# Patient Record
(demographics unavailable — no encounter records)

---

## 2024-10-11 NOTE — PLAN
[Cognitive and/or Behavior Therapy] : Cognitive and/or Behavior Therapy  [Supportive Therapy] : Supportive Therapy [FreeTextEntry2] : Goal(s): Carlitos will gain understanding of his gender identity to manage his reported symptoms of gender dysphoria; Carlitos will gain useful CBT skills to manage negative, anxious thought processes Objective(s): Carlitos will participate in psychoeducation during session related to gender identity and sexual orientation so that he can better understand himself and express that; Carlitos will practice CBT skills and report success or failure with these skills, during session; Carlitos will take steps to affirm his gender in non-permanent ways and will express how this impacts his symptoms during visits   Individual talk therapy w/LCSW 1-4x per month; Family therapy as needed, duration 6-8 months  [de-identified] : LCSW and Carlitos met for individual talk therapy in person. Carlitos presented dressed in cos-play as one of their favorite characters from a movie, they appear anxious and sad, which they report, coming tearful at times during this visit.  Carlitos does confirm that they started prescribed psychotropic medication but report some side effects/little relief at this time, but we note that they have only taken this dose for abut 2 weeks so far.  LCSW provides a safe space for Carlitos to express and explore their feelings, noting that triggers have come from some bullying from peers and issues within some of their friendships.  This has resulted in Carlitos experiencing stomach aches and anxious feelings that has interfered with them attending all of their classes. We review some specific behavioral interventions they can use to tolerate some of this anxiety, as well as supportive cognition to challenge or ignore negative thought processes, especially related to beliefs of having to "fit in" with their peers.  Carlitos does report seeking support from teachers they trust and school counselors but expresses feeling that the school can minimize their experience being bullied, and thus Carlitos's dad will communicate this with the school, ongoing until it is resolved.  We confirm our next visit for 2 weeks but Carlitos and their dad are encouraged to reach the office if Carlitos's symptoms worsen.  They will track progress with medications and are provided with resources for Carlitos to be treated by a psychiatrist who works with adolescents.    Time in: 4:03pm     Time out: 4:55pm Total time spent face to face: 52 min [FreeTextEntry1] : [facility] provides the psychiatric referrals below to Carlitos's father Devan following our visit -  [facility], outpatient Behavioral Health Parent can call to make referral: [phone]  [facility] - Behavioral Health Crisis Walk-in Can call [phone] to "hold a spot" or schedule a virtual assessment   [facility] - psychiatric services [place] office: [phone] [place] online: [email or web address]

## 2024-10-11 NOTE — REASON FOR VISIT
[Patient] : Patient [FreeTextEntry1] : routine outpatient talk therapy; the patient identifies as genderfluid, uses any pronouns and prefers to be called Carlitos

## 2024-10-11 NOTE — PLAN
[Cognitive and/or Behavior Therapy] : Cognitive and/or Behavior Therapy  [Supportive Therapy] : Supportive Therapy [FreeTextEntry2] : Goal(s): Carlitos will gain understanding of his gender identity to manage his reported symptoms of gender dysphoria; Carlitos will gain useful CBT skills to manage negative, anxious thought processes Objective(s): Carlitos will participate in psychoeducation during session related to gender identity and sexual orientation so that he can better understand himself and express that; Carlitos will practice CBT skills and report success or failure with these skills, during session; Carlitos will take steps to affirm his gender in non-permanent ways and will express how this impacts his symptoms during visits   Individual talk therapy w/LCSW 1-4x per month; Family therapy as needed, duration 6-8 months  [de-identified] : LCSW and Carlitos met for individual talk therapy in person. Carlitos presented dressed in cos-play as one of their favorite characters from a movie, they appear anxious and sad, which they report, coming tearful at times during this visit.  Carlitos does confirm that they started prescribed psychotropic medication but report some side effects/little relief at this time, but we note that they have only taken this dose for abut 2 weeks so far.  LCSW provides a safe space for Carlitos to express and explore their feelings, noting that triggers have come from some bullying from peers and issues within some of their friendships.  This has resulted in Carlitos experiencing stomach aches and anxious feelings that has interfered with them attending all of their classes. We review some specific behavioral interventions they can use to tolerate some of this anxiety, as well as supportive cognition to challenge or ignore negative thought processes, especially related to beliefs of having to "fit in" with their peers.  Carlitos does report seeking support from teachers they trust and school counselors but expresses feeling that the school can minimize their experience being bullied, and thus Carlitos's dad will communicate this with the school, ongoing until it is resolved.  We confirm our next visit for 2 weeks but Carlitos and their dad are encouraged to reach the office if Carlitos's symptoms worsen.  They will track progress with medications and are provided with resources for Carlitos to be treated by a psychiatrist who works with adolescents.    Time in: 4:03pm     Time out: 4:55pm Total time spent face to face: 52 min [FreeTextEntry1] : [facility] provides the psychiatric referrals below to Carlitos's father Devan following our visit -  [facility], outpatient Behavioral Health Parent can call to make referral: [phone]  [facility] - Behavioral Health Crisis Walk-in Can call [phone] to "hold a spot" or schedule a virtual assessment   [facility] - psychiatric services [place] office: [phone] [place] online: [email or web address]

## 2024-10-29 NOTE — REASON FOR VISIT
[Consultation for neuropsychological evaluation] : Consultation for neuropsychological evaluation [Patient with collateral] : Patient with collateral  [Father] : father [Supervisor present for visit (for trainees)] : Supervisor present for visit (for trainees). [FreeTextEntry1] : Provider discussed confidentiality of information shared in this visit and limits to confidentiality. Patient expressed understanding and agreement.

## 2024-10-29 NOTE — HISTORY OF PRESENT ILLNESS
[FreeTextEntry1] : CURRENT CONCERNS Carlitos and their father reported concerns about the following: -	Attention and behavior regulation: Carlitos shared they have had friends with ADHD who have expressed Carlitos seems to have symptoms of ADHD. Per screening by Carlitos's therapist, Carlitos endorsed difficulties sitting still, daydreaming, feeling distracted easily, sometimes feeling driven by a motor. This prompted Carlitos to seek evaluation for ADHD. Carlitos reported having difficulty paying attention when doing work and needing to listen to music to focus. They make some careless errors in work, more often when assignments are done digitally and they miss a page. They sometimes forget to complete assignments. They get easily distracted by sounds in the hallway in class. Carlitos's father does not observe these difficulties at home. Carlitos has some difficulty following multistep instructions, which their father also notices, but attributes to Carlitos not paying attention as they are distracted by their phone when given instructions about chores. Carlitos reports being forgetful at times (e.g., forgetting to complete a chore), which their father also endorses.  Carlitos reports feeling fidgety (often bouncing leg up and down, tapping pen on desk) in class, but not getting up out of their seat out of turn. They are able to stay still for long periods of time at home (e.g., staying in bed, watching TV). They and their father report Carlitos can be very chatty and trail off in conversations. Carlitos reported blurting out in class when younger, although their father does not recall this as a concern from teachers. They sometimes interrupt conversations. Does not endorse excessive running/climbing, always being on the go, difficulty playing or working quietly, difficulty waiting their turn.  Carlitos reports starting to experience difficulties with attention around 6th grade. Carlitos's father also reports Carlitos did not present with difficulties with attention or behavior throughout elementary school, and teachers always gave feedback that Carlitos was a very well-behaved student. Does not endorse significant difficulties with listening, staying organized, getting started on tasks. -	Anxiety: Carlitos reported they have always been an anxious child. Their father noted anxiety was not overt when Carlitos was younger, but became apparent when they entered 7th grade when they entered a new school. Carlitos started worrying about grades and school performance, what their parents would think of them. They worry about their future (not getting into a good college). They worry about school shootings, and this has led to some school avoidance over the past two years. Anxiety and avoidance behaviors have persisted; per records over the past two months, Carlitos has been calling their dad from school crying and wanting to picked up due to anxiety and stomachaches. Carlitos also experiences anxiety around social situations. Triggers for anxiety come from friendship issues and continuing bullying from a peer. Carlitos seeks support from trusted teachers and counselors at school but expressed feeling that the school can minimize their experience being bullied, and this is being addressed among school and the family. Carlitos experiences social anxiety and fear of judgement surrounding social interactions. Their father also reports Carlitos tends to blame themselves when things do not work out and can perseverate on certain things. Otherwise, they can also be flexible about adapting to changes in plans. Carlitos has also been worrying about their grandfather who has cancer. Carlitos reports worrying half of the day, and sometimes worries interfere with sleep onset.   -	Social functioning: Friendships have come and gone; Carlitos reported falling out with friends due to them being "toxic." Right now they have good friendships. Carlitos and their father report Carlitos having some difficulties making new friends in the past two years. Their father does not recall Carlitos having difficulty making friends when younger; Carlitos recalls some difficulties. They report being able to read others' social intentions if they know them well.    RELEVANT HISTORY  Birth/Developmental:  -	Mother had a stroke (unknown etiology) before going into labor. Carlitos was delivered via  section full term and was possibly breech. No NICU stay.  -	Had speech and fine motor delays, and received speech therapy and occupational therapy upon entering school.   Medical:  -	No significant medical history.  -	has some difficulties falling asleep and experiences some daytime sleepiness.  -	wears glasses for nearsightedness Emotional / Behavioral / Social / Adaptive:  -	History of being bullied at school related to gender identity  -	Has been in therapy twice a month with Krissy Leyva LCSW at the [facility] since 2024. Previously met with school counselor.  -	Experiences gender/body dysmorphia and has been engaging in affirming therapy with Krissy Leyva to explore gender identify and address anxiety. Identifies as genderqueer at this time. Is learning about options regarding gender affirming medical care but not engaging in medical transition at this time.  -	Per records from 2024, Carlitos had history of passive suicidal ideation without intent or plan prior to coming out about their gender identity.  -	Regarding social functioning, per records, Carlitos has expressed that they struggled at times to understand nuances of relationship dynamics, mostly with peers.   Medications: Fluoxetine 10mg, started 3 weeks ago. Iron supplements.  Educational: In the 9th grade at [employer]. Has an IEP. Parent will send most recent IEP. Has history of difficulties learning to read and had pull-out supports for reading and speech therapy during elementary school. Currently received modified reading assignments as well as texts read to them. Overall maintaining grades in the 80s to 90s. Reports science and math are harder. [place] class last year was very difficult. Art and English feel easier.    Family: Lives at home with mother, father, grandfather, step-grandmother, father's half brother, and two dogs. English is spoken at home. Father's half brother is on the autism spectrum.   Prior Cognitive Testing:  -	Maybe be due for psychoeducational testing at school; parent will ask school for records.

## 2024-10-29 NOTE — DISCUSSION/SUMMARY
[FreeTextEntry8] : Carlitos Jackson is a 14 year, AFAB genderqueer individual with history of anxiety disorder and gender dysphoria. Carlitos was referred for neuropsychological evaluation by their therapist, Krissy Leyva ([facility]) to characterize their neuropsychological functioning, gain diagnostic clarity around attention regulation difficulties, and inform intervention and education recommendations.  Carlitos is in the 9th grade with and IEP. Previously had reading supports and speech therapy. Received speech and OT when younger. Longstanding history of anxiety that exacerbated in 7th grade and continues. Over the past two years, in the context of experiencing bullying in school related to gender identity, as well as anxieties about a range of aspects of Carlitos's life, anxiety has led to school avoidance. Carlitos has also experienced some difficulties with attention and fidgeting and wonders if they have ADHD. Currently in therapy biweekly and recently started fluoxetine and follows with psychiatrist.  [FreeTextEntry4] : Comprehensive neuropsychological evaluation is recommended in order to characterize the nature and extent of the cognitive, behavioral, and emotional concerns.

## 2024-10-29 NOTE — PHYSICAL EXAM
[Intermittent] : intermittent [Cooperative] : cooperative [Euthymic] : euthymic [Full] : full [Clear] : clear [Average] : average [WNL] : within normal limits [Positive interaction] : positive interaction [Not applicable] : not applicable [FreeTextEntry6] : somewhat difficult to follow and may not answer question being asked directly

## 2024-10-30 NOTE — REASON FOR VISIT
[Patient preference] : as per patient preference [Continuing, patient seen in-person within last 12 months] : Telehealth services are continuing as patient has been seen in-person within last 12 months. [Telehealth (audio & video) - Individual/Group] : This visit was provided via telehealth using real-time 2-way audio visual technology. [Medical Office: ([facility])___] : The provider was located at the medical office in [unfilled]. [Home] : The patient, [unfilled], was located at home, [unfilled], at the time of the visit. [Patient's space is appropriate for telehealth and maintains privacy/confidentiality.] : Patient's space is appropriate for telehealth and maintains privacy/confidentiality. [Participant(s) identity verified] : Participant(s) identity verified. [Verbal consent obtained from patient/other participant(s)] : Verbal consent for telehealth/telephonic services obtained from patient/other participant(s) [FreeTextEntry4] : 4:03pm  [FreeTextEntry5] : 4:48pm [FreeTextEntry2] : 10/9/2024 [Patient] : Patient [FreeTextEntry1] : routine outpatient talk therapy; the patient identifies as gender fluid/gender queer, uses any pronouns but prefers to be called Carlitos

## 2024-10-30 NOTE — PLAN
[FreeTextEntry2] : Goal(s): Carlitos will gain understanding of his gender identity to manage his reported symptoms of gender dysphoria; Carlitos will gain useful CBT skills to manage negative, anxious thought processes Objective(s): Carlitos will participate in psychoeducation during session related to gender identity and sexual orientation so that he can better understand himself and express that; Carlitos will practice CBT skills and report success or failure with these skills, during session; Carlitos will take steps to affirm his gender in non-permanent ways and will express how this impacts his symptoms during visits   Individual talk therapy w/LCSW 1-4x per month; Family therapy as needed, duration 6-8 months  [Cognitive and/or Behavior Therapy] : Cognitive and/or Behavior Therapy  [Supportive Therapy] : Supportive Therapy [de-identified] : LCSW and Carlitos met for individual talk therapy via telehealth video visit. Carlitos was at home during this visit, they consented to this visit and confirmed that there was a private space to speak.  Carlitos presents with improved mood and anxiety this visit, is not tearful and reports improvement in some interpersonal relationships that have resulted in reduction of stressors overall.  Carlitos also reports noticing some benefit from current psychotropic medication dose, reports better understanding when to take it and that taking it with food has resulted in reduction of side effect of upset stomach.  Carlitos processes and explores some of the improved dynamics with friends and how they are working to make and connect with new friends to build their social supports, which we celebrate.  Carlitos also processes some efforts to further explore their gender expression, noting that they have been experimenting with their femininity through some cosplay with friends.  We review Carlitos's upcoming visit for psych testing and they confirm that their dad has reminded them about this visit. Our next visit is set for 2 weeks.    Time in:  4:03pm    Time out:  4:48pm Total time spent face to face, via video: 45 min   [Return in ____ week(s)] : Return in [unfilled] week(s)

## 2024-10-30 NOTE — REASON FOR VISIT
[Patient preference] : as per patient preference [Continuing, patient seen in-person within last 12 months] : Telehealth services are continuing as patient has been seen in-person within last 12 months. [Telehealth (audio & video) - Individual/Group] : This visit was provided via telehealth using real-time 2-way audio visual technology. [Medical Office: ([facility])___] : The provider was located at the medical office in [unfilled]. [Home] : The patient, [unfilled], was located at home, [unfilled], at the time of the visit. [Patient's space is appropriate for telehealth and maintains privacy/confidentiality.] : Patient's space is appropriate for telehealth and maintains privacy/confidentiality. [Participant(s) identity verified] : Participant(s) identity verified. [Verbal consent obtained from patient/other participant(s)] : Verbal consent for telehealth/telephonic services obtained from patient/other participant(s) [FreeTextEntry4] : 4:03pm  [FreeTextEntry5] : 4:48pm [FreeTextEntry2] : 10/9/2024 [Patient] : Patient [FreeTextEntry1] : routine outpatient talk therapy; the patient identifies as gender fluid/gender queer, uses any pronouns but prefers to be called Carlitos

## 2024-10-30 NOTE — PLAN
[FreeTextEntry2] : Goal(s): Carlitos will gain understanding of his gender identity to manage his reported symptoms of gender dysphoria; Carlitos will gain useful CBT skills to manage negative, anxious thought processes Objective(s): Carlitos will participate in psychoeducation during session related to gender identity and sexual orientation so that he can better understand himself and express that; Carlitos will practice CBT skills and report success or failure with these skills, during session; Carlitos will take steps to affirm his gender in non-permanent ways and will express how this impacts his symptoms during visits   Individual talk therapy w/LCSW 1-4x per month; Family therapy as needed, duration 6-8 months  [Cognitive and/or Behavior Therapy] : Cognitive and/or Behavior Therapy  [Supportive Therapy] : Supportive Therapy [de-identified] : LCSW and Carlitos met for individual talk therapy via telehealth video visit. Carlitos was at home during this visit, they consented to this visit and confirmed that there was a private space to speak.  Carlitos presents with improved mood and anxiety this visit, is not tearful and reports improvement in some interpersonal relationships that have resulted in reduction of stressors overall.  Carlitos also reports noticing some benefit from current psychotropic medication dose, reports better understanding when to take it and that taking it with food has resulted in reduction of side effect of upset stomach.  Carlitos processes and explores some of the improved dynamics with friends and how they are working to make and connect with new friends to build their social supports, which we celebrate.  Carlitos also processes some efforts to further explore their gender expression, noting that they have been experimenting with their femininity through some cosplay with friends.  We review Carlitos's upcoming visit for psych testing and they confirm that their dad has reminded them about this visit. Our next visit is set for 2 weeks.    Time in:  4:03pm    Time out:  4:48pm Total time spent face to face, via video: 45 min   [Return in ____ week(s)] : Return in [unfilled] week(s)

## 2024-11-04 NOTE — PHYSICAL EXAM
[Intermittent] : intermittent [Cooperative] : cooperative [Euthymic] : euthymic [Full] : full [Linear/Goal Directed] : linear/goal directed [Average] : average [WNL] : within normal limits [Positive interaction] : positive interaction [Unremarkable/age appropriate] : unremarkable/age appropriate [de-identified] : some speech organization issues and grammar errors
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
## 2024-11-04 NOTE — PHYSICAL EXAM
[Intermittent] : intermittent [Cooperative] : cooperative [Euthymic] : euthymic [Full] : full [Linear/Goal Directed] : linear/goal directed [Average] : average [WNL] : within normal limits [Positive interaction] : positive interaction [Unremarkable/age appropriate] : unremarkable/age appropriate [de-identified] : some speech organization issues and grammar errors
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
## 2024-11-11 NOTE — PLAN
[Cognitive and/or Behavior Therapy] : Cognitive and/or Behavior Therapy  [Supportive Therapy] : Supportive Therapy [FreeTextEntry2] : Goal(s): Carlitos will gain understanding of his gender identity to manage his reported symptoms of gender dysphoria; Carlitos will gain useful CBT skills to manage negative, anxious thought processes Objective(s): Carlitos will participate in psychoeducation during session related to gender identity and sexual orientation so that he can better understand himself and express that; Carlitos will practice CBT skills and report success or failure with these skills, during session; Carlitos will take steps to affirm his gender in non-permanent ways and will express how this impacts his symptoms during visits   Individual talk therapy w/LCSW 1-4x per month; Family therapy as needed, duration 6-8 months  [de-identified] : LCSW and Carlitos met for individual talk therapy in person. Carlitos presented as casually dressed and androgynously dressed and was engaged in our visit.  Carlitos presents as emotionally stable with some improvement in emotional expression since last visit.  Carlitos uses the session to process and explore their feelings associated with the end of a friendship with their identified "best friend."  They provide some context to this and their efforts to receive support from their parents and some other friends as they processed this change.  Carlitos can identify the unhealthy aspects of this friendship and thus can support themselves in their decision making related to ending this friendship.  They note the value of having friends that identify as part of the LGBTQ+ community but note that this was the only thing they felt that they had in common with this friend recently, and can identify the value of seeking friendships with others at this time.  Carlitos reports efforts also to further experiment with their gender expression and feels comfortable right now as themself. We review some self-care activities that Carlitos has engaged in to help manage their anxiety.  Carlitos and their father, whom we check-in with at the end of the session, confirm that Carlitos's symptoms of anxiety appear to be better managed with current psychotropic medication dose.  They also confirm completion of psychological testing with [facility] (Dr. Teena Osman) and are awaiting results regarding additional diagnoses.  We confirm our next visit for 2 weeks.    Time in:  4:03pm      Time out: 4:55pm Total time spent face to face: 52 min   [Return in ____ week(s)] : Return in [unfilled] week(s)

## 2024-11-11 NOTE — REASON FOR VISIT
[Patient] : Patient [FreeTextEntry1] : routine outpatient talk therapy; the patient prefers to be called Carlitos and is comfortable with any pronouns

## 2024-12-02 NOTE — DISCUSSION/SUMMARY
[FreeTextEntry8] : Evaluation results discussed with patient and family. Provider answered patient's questions. Family expressed understanding of results and recommendations. [FreeTextEntry4] : Report to follow.

## 2024-12-02 NOTE — HISTORY OF PRESENT ILLNESS
[FreeTextEntry1] : CURRENT CONCERNS Carlitos and their father reported concerns about the following: -	Attention and behavior regulation: Carlitos shared they have had friends with ADHD who have expressed Carlitos seems to have symptoms of ADHD. Per screening by Carlitos's therapist, Carlitos endorsed difficulties sitting still, daydreaming, feeling distracted easily, sometimes feeling driven by a motor. This prompted Carlitos to seek evaluation for ADHD. Carlitos reported having difficulty paying attention when doing work and needing to listen to music to focus. They make some careless errors in work, more often when assignments are done digitally and they miss a page. They sometimes forget to complete assignments. They get easily distracted by sounds in the hallway in class. Carlitos's father does not observe these difficulties at home. Carlitos has some difficulty following multistep instructions, which their father also notices, but attributes to Carlitos not paying attention as they are distracted by their phone when given instructions about chores. Carlitos reports being forgetful at times (e.g., forgetting to complete a chore), which their father also endorses.  Carlitos reports feeling fidgety (often bouncing leg up and down, tapping pen on desk) in class, but not getting up out of their seat out of turn. They are able to stay still for long periods of time at home (e.g., staying in bed, watching TV). They and their father report Carlitos can be very chatty and trail off in conversations. Carlitos reported blurting out in class when younger, although their father does not recall this as a concern from teachers. They sometimes interrupt conversations. Does not endorse excessive running/climbing, always being on the go, difficulty playing or working quietly, difficulty waiting their turn.  Carlitos reports starting to experience difficulties with attention around 6th grade. Carlitos's father also reports Carlitos did not present with difficulties with attention or behavior throughout elementary school, and teachers always gave feedback that Carlitos was a very well-behaved student. Does not endorse significant difficulties with listening, staying organized, getting started on tasks. -	Anxiety: Carlitos reported they have always been an anxious child. Their father noted anxiety was not overt when Carlitos was younger, but became apparent when they entered 7th grade when they entered a new school. Carlitos started worrying about grades and school performance, what their parents would think of them. They worry about their future (not getting into a good college). They worry about school shootings, and this has led to some school avoidance over the past two years. Anxiety and avoidance behaviors have persisted; per records over the past two months, Carlitos has been calling their dad from school crying and wanting to picked up due to anxiety and stomachaches. Carlitos also experiences anxiety around social situations. Triggers for anxiety come from friendship issues and continuing bullying from a peer. Carlitos seeks support from trusted teachers and counselors at school but expressed feeling that the school can minimize their experience being bullied, and this is being addressed among school and the family. Carlitos experiences social anxiety and fear of judgement surrounding social interactions. Their father also reports Carlitos tends to blame themselves when things do not work out and can perseverate on certain things. Otherwise, they can also be flexible about adapting to changes in plans. Carlitos has also been worrying about their grandfather who has cancer. Carlitos reports worrying half of the day, and sometimes worries interfere with sleep onset.   -	Social functioning: Friendships have come and gone; Carlitos reported falling out with friends due to them being "toxic." Right now they have good friendships. Carlitos and their father report Carlitos having some difficulties making new friends in the past two years. Their father does not recall Carlitos having difficulty making friends when younger; Carlitos recalls some difficulties. They report being able to read others' social intentions if they know them well.    RELEVANT HISTORY  Birth/Developmental:  -	Mother had a stroke (unknown etiology) before going into labor. Carlitos was delivered via  section full term and was possibly breech. No NICU stay.  -	Had speech and fine motor delays, and received speech therapy and occupational therapy upon entering school.   Medical:  -	No significant medical history.  -	has some difficulties falling asleep and experiences some daytime sleepiness.  -	wears glasses for nearsightedness Emotional / Behavioral / Social / Adaptive:  -	History of being bullied at school related to gender identity  -	Has been in therapy twice a month with Krissy Leyva LCSW at the [facility] since 2024. Previously met with school counselor.  -	Experiences gender/body dysmorphia and has been engaging in affirming therapy with Krissy Leyva to explore gender identify and address anxiety. Identifies as genderqueer at this time. Is learning about options regarding gender affirming medical care but not engaging in medical transition at this time.  -	Per records from 2024, Carlitos had history of passive suicidal ideation without intent or plan prior to coming out about their gender identity.  -	Regarding social functioning, per records, Carliots has expressed that they struggled at times to understand nuances of relationship dynamics, mostly with peers.   Medications: Fluoxetine 10mg, started 3 weeks ago. Iron supplements.  Educational: In the 9th grade at [employer]. Has an IEP. Parent will send most recent IEP. Has history of difficulties learning to read and had pull-out supports for reading and speech therapy during elementary school. Currently received modified reading assignments as well as texts read to them. Overall maintaining grades in the 80s to 90s. Reports science and math are harder. [place] class last year was very difficult. Art and English feel easier.    Family: Lives at home with mother, father, grandfather, step-grandmother, father's half brother, and two dogs. English is spoken at home. Father's half brother is on the autism spectrum.   Prior Cognitive Testing:  -	Maybe be due for psychoeducational testing at school; parent will ask school for records.

## 2024-12-02 NOTE — REASON FOR VISIT
[Patient preference] : as per patient preference [Starting, patient seen in-person within last 6 months] : Telehealth services are being started as patient has seen in-person within last 6 months. [Telehealth (audio & video) - Individual/Group] : This visit was provided via telehealth using real-time 2-way audio visual technology. [Medical Office: ([facility])___] : The provider was located at the medical office in [unfilled]. [Home] : The patient, [unfilled], was located at home, [unfilled], at the time of the visit. [Patient's space is appropriate for telehealth and maintains privacy/confidentiality.] : Patient's space is appropriate for telehealth and maintains privacy/confidentiality. [Participant(s) identity verified] : Participant(s) identity verified. [Verbal consent obtained from patient/other participant(s)] : Verbal consent for telehealth/telephonic services obtained from patient/other participant(s) [FreeTextEntry4] : 2:30pm [FreeTextEntry5] : 3:30pm [Feedback of results of neuropsychological evaluation] : Feedback of results of neuropsychological evaluation [Patient with collateral] : Patient with collateral  [Father] : father [FreeTextEntry1] : Carlitos Jackson is a 14 year-old AFAB genderqueer individual with history of anxiety disorder and gender dysphoria. Carlitos was referred for neuropsychological evaluation by their therapist, Krissy Leyva ([facility]) to characterize their neuropsychological functioning, gain diagnostic clarity around attention regulation difficulties, and inform intervention and education recommendations.

## 2024-12-09 NOTE — REASON FOR VISIT
[Patient] : Patient [FreeTextEntry1] : Routine outpatient talk therapy; the patient identifies as gender fluid, uses any pronouns and prefers to be called Carlitos

## 2024-12-09 NOTE — PLAN
[FreeTextEntry2] : Goal(s): Carlitos will gain understanding of his gender identity to manage his reported symptoms of gender dysphoria; Carlitos will gain useful CBT skills to manage negative, anxious thought processes Objective(s): Carlitos will participate in psychoeducation during session related to gender identity and sexual orientation so that he can better understand himself and express that; Carlitos will practice CBT skills and report success or failure with these skills, during session; Carlitos will take steps to affirm his gender in non-permanent ways and will express how this impacts his symptoms during visits   Individual talk therapy w/LCSW 1-4x per month; Family therapy as needed, duration 6-8 months  [Cognitive and/or Behavior Therapy] : Cognitive and/or Behavior Therapy  [Supportive Therapy] : Supportive Therapy [de-identified] : LCSW and Carlitos met for individual talk therapy in person. Carlitos presented as well-groomed, casually dressed and engaged in our visit.  While getting Carlitos from the waiting room, Carlitos's dad provides some updates regarding outcome of psych testing that confirms Carlitos's anxiety, though they were not diagnosed with ADHD, it further explains some of their learning disabilities and auditory processing challenges.  Mr. Jackson shares that he will be providing the updated report to Carlitos's school to update their IEP and that the psychologists confirm that Carlitos can remain on current dose of medication; Dr. Gordillo is informed.  In session Carlitos reports that overall they are doing well, sharing a major reduction in anxiety with current psychotropic medication noting that their anxiety is more manageable for them and has thus interfered less with their ability to attend school regularly and engage socially.  Carlitos provides some updates about their efforts to better manage friendships, noting making some new friends and better tolerating some bullying from other peers.  Carlitos continues to feel more comfortable being called by their preferred names but notes exploring their gender identity, more femininely through clothing styles and support from a new friend who has lent Carlitos some of their clothes.  We celebrate this improvement and confirm our next visit for 2 weeks.   Time in: 3:50pm     Time out: 4:36pm Total time spent face to face: 46 min [Return in ____ week(s)] : Return in [unfilled] week(s)

## 2025-01-09 NOTE — PLAN
[Cognitive and/or Behavior Therapy] : Cognitive and/or Behavior Therapy  [Supportive Therapy] : Supportive Therapy [FreeTextEntry2] : Goal(s): Carlitos will gain understanding of his gender identity to manage his reported symptoms of gender dysphoria; Carlitos will gain useful CBT skills to manage negative, anxious thought processes Objective(s): Carlitos will participate in psychoeducation during session related to gender identity and sexual orientation so that he can better understand himself and express that; Carlitos will practice CBT skills and report success or failure with these skills, during session; Carlitos will take steps to affirm his gender in non-permanent ways and will express how this impacts his symptoms during visits   Individual talk therapy w/LCSW 1-4x per month; Family therapy as needed, duration 6-8 months  [de-identified] : LCSW and Carlitos met for individual talk therapy via telehealth video visit. Carlitos was at home during this visit, Carlitos consented to this visit and confirmed that there was a private space to speak.  Carlitos shares that they are doing ok, noting that "really nothing" is new.  They report enjoying celebrating the holidays with their family and used some gift money to purchase new clothing for themselves.  Carlitos admits that it has been "confusing" as they continue to explore their gender expression and thus gender identity after noting that they purchased more feminine clothing and received many compliments at school for their new clothes.  We explore their ability to continue to "experiment" with what they prefer and what makes them feel comfortable.  They report experiencing less dysphoria overall as they explore their fluidity, likely as they note feeling more comfortable with femininity than when we originally started meeting.  We agree to continue to monitor this and set our next visit for 2 weeks.   Time in: 4:09pm     Time out:  4:35pm Total time spent face to face, via video: 26 min [Return in ____ week(s)] : Return in [unfilled] week(s)

## 2025-01-09 NOTE — REASON FOR VISIT
[Patient preference] : as per patient preference [Continuing, patient seen in-person within last 12 months] : Telehealth services are continuing as patient has been seen in-person within last 12 months. [Telehealth (audio & video) - Individual/Group] : This visit was provided via telehealth using real-time 2-way audio visual technology. [Medical Office: ([facility])___] : The provider was located at the medical office in [unfilled]. [Home] : The patient, [unfilled], was located at home, [unfilled], at the time of the visit. [Patient's space is appropriate for telehealth and maintains privacy/confidentiality.] : Patient's space is appropriate for telehealth and maintains privacy/confidentiality. [Participant(s) identity verified] : Participant(s) identity verified. [Verbal consent obtained from patient/other participant(s)] : Verbal consent for telehealth/telephonic services obtained from patient/other participant(s) [Patient] : Patient [FreeTextEntry4] : 4:09pm [FreeTextEntry5] : 4:35pm [FreeTextEntry1] : Routine outpatient talk therapy; the patient identifies as gender fluid, uses any pronouns and prefers to be called Carlitos

## 2025-01-28 NOTE — REASON FOR VISIT
[Patient preference] : as per patient preference [Continuing, patient seen in-person within last 12 months] : Telehealth services are continuing as patient has been seen in-person within last 12 months. [Telehealth (audio & video) - Individual/Group] : This visit was provided via telehealth using real-time 2-way audio visual technology. [Medical Office: ([facility])___] : The provider was located at the medical office in [unfilled]. [Home] : The patient, [unfilled], was located at home, [unfilled], at the time of the visit. [Patient's space is appropriate for telehealth and maintains privacy/confidentiality.] : Patient's space is appropriate for telehealth and maintains privacy/confidentiality. [Participant(s) identity verified] : Participant(s) identity verified. [Verbal consent obtained from patient/other participant(s)] : Verbal consent for telehealth/telephonic services obtained from patient/other participant(s) [FreeTextEntry4] : 4:04pm [FreeTextEntry5] : 4:49pm [Patient] : Patient [FreeTextEntry1] : Routine outpatient talk therapy; the patient identifies as gender fluid, uses any pronouns and prefers to be called Carlitos

## 2025-01-28 NOTE — PLAN
[FreeTextEntry2] : Goal(s): Carlitos will gain understanding of his gender identity to manage his reported symptoms of gender dysphoria; Carlitos will gain useful CBT skills to manage negative, anxious thought processes Objective(s): Carlitos will participate in psychoeducation during session related to gender identity and sexual orientation so that he can better understand himself and express that; Carlitos will practice CBT skills and report success or failure with these skills, during session; Carlitos will take steps to affirm his gender in non-permanent ways and will express how this impacts his symptoms during visits   Individual talk therapy w/LCSW 1-4x per month; Family therapy as needed, duration 6-8 months  [Cognitive and/or Behavior Therapy] : Cognitive and/or Behavior Therapy  [Supportive Therapy] : Supportive Therapy [de-identified] : LCSW and Carlitos met for individual talk therapy via telehealth video visit. Carlitos was at home during this visit, he consented to this visit and confirmed that there was a private space to speak.  Carlitos shares that they are doing ok, noting that they have had a long weekend and are taking mid-term exams this week.  Carlitos continues to report improvement and better management of their anxiety overall.  We explore some boundary setting as they note feeling frustrated with a friend who they believe is taking advantage of some of their niceness.  Carlitos is able to identify feeling frustrated but notes the value of standing up for themself and can note how other friends have made them feel appreciated and supported compared with this other friend.  Regarding their gender identity, Carlitos continues to appreciate being themselves and exploring their gender expression in ways that feel comfortable for them.  They remain firm on using their preferred name but better appreciate their feminine expression.  We will continue to monitor Carlitos's anxiety and triggers but note some of their progress.  Support is offered.   Time in:  4:04pm     Time out: 4:49pm Total time spent face to face, via video: 45 min  [Return in ____ week(s)] : Return in [unfilled] week(s)

## 2025-02-20 NOTE — REASON FOR VISIT
[Patient] : Patient [FreeTextEntry1] : routine outpatient talk therapy; the patient is comfortable with any pronouns prefers to be called Carlitos

## 2025-02-20 NOTE — PLAN
[FreeTextEntry2] : Goal(s): Carlitos will gain understanding of his gender identity to manage his reported symptoms of gender dysphoria; Carlitos will gain useful CBT skills to manage negative, anxious thought processes Objective(s): Carlitos will participate in psychoeducation during session related to gender identity and sexual orientation so that he can better understand himself and express that; Carlitos will practice CBT skills and report success or failure with these skills, during session; Carlitos will take steps to affirm his gender in non-permanent ways and will express how this impacts his symptoms during visits   Individual talk therapy w/LCSW 1-4x per month; Family therapy as needed, duration 6-8 months  [Cognitive and/or Behavior Therapy] : Cognitive and/or Behavior Therapy  [Supportive Therapy] : Supportive Therapy [de-identified] : LCSW and Carlitos met for individual talk therapy in person. Carlitos presented as well-groomed, casually dressed and engaged in our visit.  Overall, Carlitos reports doing ok, noting that they are appreciating being on break for school holidays.  Carlitos processes some increase in anxiety that interfered with their ability to go to school the week prior, noting that the trigger was a specific interaction with a peer whom Carlitos felt pressure from and thus their anxiety manifested through reported physical symptoms like upset stomach and vomiting.  Carlitos's parents continue to be advocates for Carlitos and the issue was addressed but not "dealt with" according to Carlitos but they note feeling less anxious about it and express their plan to "ignore" this person.  Outside of this issue, Carlitos denies any issues with their coursework, report maintaining close friendship with three other peers and continues to express their gender in ways that are comfortable to them.  Carlitos continues to feel that all pronouns suit them and that their gender expression and feelings can shift day by day.  Carlitos denies any major distress from their physical body, noting that outside of the pain/discomfort associated with their monthly cycle "cramps", that they do not have any major issues with it.  Carlitos denies wanting to discuss puberty blockers and is comfortable with their body as is, for now.  LCSW observes that Carlitos refers to themselves as she and they at different points during the session.  We confirm our next visit for 2 weeks, and agree that Carlitos is comfortable with current dose of psychotropic medication that Dr. Gordillo is managing.    Time in:  4:05pm    Time out: 4:48pm Total time spent face to face: 43 min [Return in ____ week(s)] : Return in [unfilled] week(s)

## 2025-03-11 NOTE — PLAN
[FreeTextEntry2] : Goal(s): Carlitos will gain understanding of his gender identity to manage his reported symptoms of gender dysphoria; Carlitos will gain useful CBT skills to manage negative, anxious thought processes Objective(s): Carlitos will participate in psychoeducation during session related to gender identity and sexual orientation so that he can better understand himself and express that; Carlitos will practice CBT skills and report success or failure with these skills, during session; Carlitos will take steps to affirm his gender in non-permanent ways and will express how this impacts his symptoms during visits   Individual talk therapy w/LCSW 1-4x per month; Family therapy as needed, duration 6-8 months  [Cognitive and/or Behavior Therapy] : Cognitive and/or Behavior Therapy  [Supportive Therapy] : Supportive Therapy [de-identified] : LCSW and Carlitos met for individual talk therapy via telehealth video visit. Carlitos was at home during this visit, they consented to this visit and confirmed that there was a private space to speak.  Carlitos presents as engaged in our session, is overall calm but becomes agitated as they speak about some interpersonal dynamics with friends at school.  Carlitos notes how the current political climate plays out in their school with some cisgender male peers making negative comments about female peers and Carlitos expresses getting frustrated by this and thus standing up for themselves and their female peers, mostly in relation to how traditional societal norms can be broken.  We explore how Carlitos hopes to continue to break with traditional norms even if they identify with their feminine side, mostly related to women having to "be in the home cooking and cleaning."  Carlitos continues to present their own gender identity in ways that are comfortable for themselves, showing off their outfit, jeans with bows on them.  Outside of some stress from these interactions, Carlitos reports that their anxiety is managed allowing them to attend school and complete necessary school work, as well as feel comfortable with their identity at this time.  Support and encouragement are offered.    Time in:  4:08pm    Time out:  4:51pm Total time spent face to face, via video: 43 min  [Return in ____ week(s)] : Return in [unfilled] week(s)

## 2025-03-11 NOTE — REASON FOR VISIT
[Patient preference] : as per patient preference [Continuing, patient seen in-person within last 12 months] : Telehealth services are continuing as patient has been seen in-person within last 12 months. [Telehealth (audio & video) - Individual/Group] : This visit was provided via telehealth using real-time 2-way audio visual technology. [Medical Office: ([facility])___] : The provider was located at the medical office in [unfilled]. [Home] : The patient, [unfilled], was located at home, [unfilled], at the time of the visit. [Patient's space is appropriate for telehealth and maintains privacy/confidentiality.] : Patient's space is appropriate for telehealth and maintains privacy/confidentiality. [Participant(s) identity verified] : Participant(s) identity verified. [Verbal consent obtained from patient/other participant(s)] : Verbal consent for telehealth/telephonic services obtained from patient/other participant(s) [FreeTextEntry4] : 4:08pm [FreeTextEntry5] : 4:51pm [FreeTextEntry2] : Feb, 2025 [Patient] : Patient [FreeTextEntry1] : Routine outpatient talk therapy; the patient identifies as gender fluid, uses any pronouns and prefers to be called Carlitos

## 2025-03-27 NOTE — PLAN
[FreeTextEntry2] : Goal(s): Carlitos will gain understanding of his gender identity to manage his reported symptoms of gender dysphoria; Carlitos will gain useful CBT skills to manage negative, anxious thought processes Objective(s): Carlitos will participate in psychoeducation during session related to gender identity and sexual orientation so that he can better understand himself and express that; Carlitos will practice CBT skills and report success or failure with these skills, during session; Carlitos will take steps to affirm his gender in non-permanent ways and will express how this impacts his symptoms during visits   Individual talk therapy w/LCSW 1-4x per month; Family therapy as needed, duration 6-8 months  [Cognitive and/or Behavior Therapy] : Cognitive and/or Behavior Therapy  [Supportive Therapy] : Supportive Therapy [de-identified] : LCSW and Carlitos met for individual talk therapy in person. Carlitos presented as well-groomed, casually dressed and engaged in our visit.  Carlitos is wearing their hair long, dressed androgynously today though they note exploring their femininity more recently like experimenting with make-up, purchasing some new clothing that they note is more feminine.  Carlitos reports feeling "blah" because of their coming period and we explore if this distress is dysphoria related, which they deny that it is, noting that overall they are feeling comfortable with their body at this time.  They continue to prefer the name Carlitos and are comfortable using any pronouns at this time.  They process ongoing stress at home surrounding their grandfather's health after another recent hospitalization, with ongoing understanding that he could pass soon or at any time.  They also express some anxiety for their parents and note how they have worked to recently make things special for their mom's birthday.  We explore additional ways to manage their anxiety but also validate the feelings they describe.  They report no major issues with school and note looking forward to the school year coming to an end in coming months. LCSW shares her pregnancy with Carlitos and informs them and their dad of plans for maternity leave in three months, as well as information about coverage while she is out.  Carlitos shares appropriate congratulations and confirms that they will likely pause their care until this LCSW returns but we agree to assess closer to this time. Next visit set for 2 weeks. 								   Time in:  4:02pm      Time out:  4:47pm Total time spent face to face: 45 min [Return in ____ week(s)] : Return in [unfilled] week(s)

## 2025-03-27 NOTE — PLAN
[FreeTextEntry2] : Goal(s): Carlitos will gain understanding of his gender identity to manage his reported symptoms of gender dysphoria; Carlitos will gain useful CBT skills to manage negative, anxious thought processes Objective(s): Carlitos will participate in psychoeducation during session related to gender identity and sexual orientation so that he can better understand himself and express that; Carlitos will practice CBT skills and report success or failure with these skills, during session; Carlitos will take steps to affirm his gender in non-permanent ways and will express how this impacts his symptoms during visits   Individual talk therapy w/LCSW 1-4x per month; Family therapy as needed, duration 6-8 months  [Cognitive and/or Behavior Therapy] : Cognitive and/or Behavior Therapy  [Supportive Therapy] : Supportive Therapy [de-identified] : LCSW and Carlitos met for individual talk therapy in person. Carlitos presented as well-groomed, casually dressed and engaged in our visit.  Carlitos is wearing their hair long, dressed androgynously today though they note exploring their femininity more recently like experimenting with make-up, purchasing some new clothing that they note is more feminine.  Carlitos reports feeling "blah" because of their coming period and we explore if this distress is dysphoria related, which they deny that it is, noting that overall they are feeling comfortable with their body at this time.  They continue to prefer the name Carlitos and are comfortable using any pronouns at this time.  They process ongoing stress at home surrounding their grandfather's health after another recent hospitalization, with ongoing understanding that he could pass soon or at any time.  They also express some anxiety for their parents and note how they have worked to recently make things special for their mom's birthday.  We explore additional ways to manage their anxiety but also validate the feelings they describe.  They report no major issues with school and note looking forward to the school year coming to an end in coming months. LCSW shares her pregnancy with Carlitos and informs them and their dad of plans for maternity leave in three months, as well as information about coverage while she is out.  Carlitos shares appropriate congratulations and confirms that they will likely pause their care until this LCSW returns but we agree to assess closer to this time. Next visit set for 2 weeks. 								   Time in:  4:02pm      Time out:  4:47pm Total time spent face to face: 45 min [Return in ____ week(s)] : Return in [unfilled] week(s)

## 2025-04-10 NOTE — PHYSICAL EXAM
[Well Developed] : well developed [Well Nourished] : well nourished [NAD] : in no acute distress [Alert and Active] : alert and active [PERRL] : pupils were equal, round, reactive to light  [icteric] : anicteric [Moist & Pink Mucous Membranes] : moist and pink mucous membranes [Normal Oropharynx] : the oropharynx was normal [Oral Ulcers] : no oral ulcers [CTAB] : lungs clear to auscultation bilaterally [Respiratory Distress] : no respiratory distress  [Regular Rate and Rhythm] : regular rate and rhythm [Normal S1, S2] : normal S1 and S2 [Soft] : soft  [Distended] : non distended [Tender] : non tender [Normal Bowel Sounds] : normal bowel sounds [No HSM] : no hepatosplenomegaly appreciated [Normal Tone] : normal tone [Well-Perfused] : well-perfused [Edema] : no edema [Cyanosis] : no cyanosis [Rash] : no rash [Jaundice] : no jaundice [Interactive] : interactive [de-identified] : no perianal lesions

## 2025-04-10 NOTE — CONSULT LETTER
[Dear  ___] : Dear  [unfilled], [Consult Letter:] : I had the pleasure of evaluating your patient, [unfilled]. [Please see my note below.] : Please see my note below. [Consult Closing:] : Thank you very much for allowing me to participate in the care of this patient.  If you have any questions, please do not hesitate to contact me. [Sincerely,] : Sincerely, [FreeTextEntry3] : India Petit MD Attending Physician, Pediatric Gastroenterology [facility]

## 2025-04-10 NOTE — PHYSICAL EXAM
[Well Developed] : well developed [Well Nourished] : well nourished [NAD] : in no acute distress [Alert and Active] : alert and active [PERRL] : pupils were equal, round, reactive to light  [icteric] : anicteric [Moist & Pink Mucous Membranes] : moist and pink mucous membranes [Normal Oropharynx] : the oropharynx was normal [Oral Ulcers] : no oral ulcers [CTAB] : lungs clear to auscultation bilaterally [Respiratory Distress] : no respiratory distress  [Regular Rate and Rhythm] : regular rate and rhythm [Normal S1, S2] : normal S1 and S2 [Soft] : soft  [Distended] : non distended [Tender] : non tender [Normal Bowel Sounds] : normal bowel sounds [No HSM] : no hepatosplenomegaly appreciated [Normal Tone] : normal tone [Well-Perfused] : well-perfused [Edema] : no edema [Cyanosis] : no cyanosis [Rash] : no rash [Jaundice] : no jaundice [Interactive] : interactive [de-identified] : no perianal lesions

## 2025-04-10 NOTE — REASON FOR VISIT
[Consultation] : a consultation visit [Patient] : patient [Father] : father [FreeTextEntry2] : diarrhea

## 2025-04-10 NOTE — HISTORY OF PRESENT ILLNESS
[de-identified] : 15yo F with anxiety here for evaluation of diarrhea   Typical stooling pattern, once daily, soft, no blood  Starting 3 weeks ago, 1-2x weekly abdominal pain Brief, self resolving episodes maybe 30 minutes, periumbilical, cramping  Always resolves with defecation  Typically very watery stool when has pain  No blood in stool  Associated with urgency, no incontinence  No nighttime awakenings  Feels better after bowel movement Since start of symptoms, on days with no pain, normal soft formed bowel movement  No unexplained fever, rash, joint pains, oral ulcers Eating well, no fatigue, no weight loss

## 2025-04-10 NOTE — HISTORY OF PRESENT ILLNESS
[de-identified] : 15yo F with anxiety here for evaluation of diarrhea   Typical stooling pattern, once daily, soft, no blood  Starting 3 weeks ago, 1-2x weekly abdominal pain Brief, self resolving episodes maybe 30 minutes, periumbilical, cramping  Always resolves with defecation  Typically very watery stool when has pain  No blood in stool  Associated with urgency, no incontinence  No nighttime awakenings  Feels better after bowel movement Since start of symptoms, on days with no pain, normal soft formed bowel movement  No unexplained fever, rash, joint pains, oral ulcers Eating well, no fatigue, no weight loss

## 2025-04-10 NOTE — CONSULT LETTER
[Dear  ___] : Dear  [unfilled], [Consult Letter:] : I had the pleasure of evaluating your patient, [unfilled]. [Please see my note below.] : Please see my note below. [Consult Closing:] : Thank you very much for allowing me to participate in the care of this patient.  If you have any questions, please do not hesitate to contact me. [Sincerely,] : Sincerely, [FreeTextEntry3] : India Petit MD Attending Physician, Pediatric Gastroenterology [facility]

## 2025-05-19 NOTE — REASON FOR VISIT
[Patient preference] : as per patient preference [Continuing, patient seen in-person within last 12 months] : Telehealth services are continuing as patient has been seen in-person within last 12 months. [Telehealth (audio & video) - Individual/Group] : This visit was provided via telehealth using real-time 2-way audio visual technology. [Medical Office: ([facility])___] : The provider was located at the medical office in [unfilled]. [Home] : The patient, [unfilled], was located at home, [unfilled], at the time of the visit. [Patient's space is appropriate for telehealth and maintains privacy/confidentiality.] : Patient's space is appropriate for telehealth and maintains privacy/confidentiality. [Participant(s) identity verified] : Participant(s) identity verified. [Verbal consent obtained from patient/other participant(s)] : Verbal consent for telehealth/telephonic services obtained from patient/other participant(s) [Patient] : Patient [FreeTextEntry4] : 4:04pm [FreeTextEntry5] : 4:33pm [FreeTextEntry2] : March, 2025 [FreeTextEntry1] :  routine outpatient talk therapy; the patient prefers to be called Carlitos

## 2025-05-19 NOTE — PLAN
[Cognitive and/or Behavior Therapy] : Cognitive and/or Behavior Therapy  [Supportive Therapy] : Supportive Therapy [FreeTextEntry2] : Goal(s): Carlitos will gain understanding of his gender identity to manage his reported symptoms of gender dysphoria; Carlitos will gain useful CBT skills to manage negative, anxious thought processes Objective(s): Carlitos will participate in psychoeducation during session related to gender identity and sexual orientation so that he can better understand himself and express that; Carlitos will practice CBT skills and report success or failure with these skills, during session; Carlitos will take steps to affirm his gender in non-permanent ways and will express how this impacts his symptoms during visits   Individual talk therapy w/LCSW 1-4x per month; Family therapy as needed, duration 6-8 months  [de-identified] : LCSW and Carlitos met for individual talk therapy via telehealth video visit. Carlitos was at home during this visit, he consented to this visit and confirmed that there was a private space to speak.  Carlitos presents as engaged but does not have much to focus on this session despite us not meeting in almost two months.  Overall, Carlitos notes that their anxiety has been managed and only recently has been triggered due to school assignments and starting to prep for final exams, which we validate.  Carlitos denies any issues with psychotropic medication doses.  Carlitos does not spontaneously bring up their gender identity or any pertinent feelings about their body.  In fact, Carlitos is observed referring to themself as their mother's "daughter" in the context of the conversation.  Carlitos complains about their monthly cycle but mostly from a standpoint related to the difficulties of tolerating cramps, not necessarily from a gender dysphoria lens.  LCSW encourages Carlitos to identify how they are feeling about their care, which Carlitos struggles to do and states "ask my parents" which this LCSW challenges in relation to Carlitos being the active patient. We agree to meet again in 4 weeks to re-assess care and likely as a final visit before this LCSW is out on maternity leave.    Time in:  4:04pm    Time out:  4:33pm Total time spent face to face, via video: 29 min  [Return in ____ week(s)] : Return in [unfilled] week(s) [FreeTextEntry1] : LCSW believes Carlitos's presenting issues are mostly related to VIJAYA and have been less focused on gender identity thus Carlitos may benefit from a working with a community provider as they do not describe symptoms of gender dysphoria. We will re-assess in 4 weeks.

## 2025-07-30 NOTE — HISTORY OF PRESENT ILLNESS
[de-identified] : 13yo F with anxiety here for follow up of diarrhea   Soon after visit in April all symptoms resolved Labs reassuring and stool studies never submitted Currently stooling once daily [place] 4, no blood  No urgency, no incontinence, No nighttime awakenings No abdominal pain, nausea or vomiting  No unexplained fever, rash, joint pains, oral ulcers Eating well, no fatigue  5lb weight loss in last few months Reports started biking in the Spring Normal BMI

## 2025-07-30 NOTE — CONSULT LETTER
[Dear  ___] : Dear  [unfilled], [Consult Letter:] : I had the pleasure of evaluating your patient, [unfilled]. [Please see my note below.] : Please see my note below. [Consult Closing:] : Thank you very much for allowing me to participate in the care of this patient.  If you have any questions, please do not hesitate to contact me. [Sincerely,] : Sincerely, [FreeTextEntry3] : India Petit MD Attending Physician, Pediatric Gastroenterology [facility]